# Patient Record
Sex: MALE | Race: WHITE | NOT HISPANIC OR LATINO | URBAN - METROPOLITAN AREA
[De-identification: names, ages, dates, MRNs, and addresses within clinical notes are randomized per-mention and may not be internally consistent; named-entity substitution may affect disease eponyms.]

---

## 2020-07-02 ENCOUNTER — TELEPHONE ENCOUNTER (OUTPATIENT)
Dept: URBAN - METROPOLITAN AREA CLINIC 19 | Facility: CLINIC | Age: 70
End: 2020-07-02

## 2020-07-09 ENCOUNTER — OFFICE VISIT (OUTPATIENT)
Dept: URBAN - METROPOLITAN AREA CLINIC 19 | Facility: CLINIC | Age: 70
End: 2020-07-09
Payer: COMMERCIAL

## 2020-07-09 DIAGNOSIS — K44.9 HIATAL HERNIA: ICD-10-CM

## 2020-07-09 DIAGNOSIS — K21.9 GASTROESOPHAGEAL REFLUX DISEASE, ESOPHAGITIS PRESENCE NOT SPECIFIED: ICD-10-CM

## 2020-07-09 DIAGNOSIS — K22.4 INEFFECTIVE ESOPHAGEAL MOTILITY: ICD-10-CM

## 2020-07-09 DIAGNOSIS — R05 CHRONIC COUGH: ICD-10-CM

## 2020-07-09 PROBLEM — 235595009: Status: ACTIVE | Noted: 2020-07-09

## 2020-07-09 PROCEDURE — G8427 DOCREV CUR MEDS BY ELIG CLIN: HCPCS | Performed by: INTERNAL MEDICINE

## 2020-07-09 PROCEDURE — 99213 OFFICE O/P EST LOW 20 MIN: CPT | Performed by: INTERNAL MEDICINE

## 2020-07-09 PROCEDURE — G8420 CALC BMI NORM PARAMETERS: HCPCS | Performed by: INTERNAL MEDICINE

## 2020-07-09 PROCEDURE — G9903 PT SCRN TBCO ID AS NON USER: HCPCS | Performed by: INTERNAL MEDICINE

## 2020-07-09 PROCEDURE — 3017F COLORECTAL CA SCREEN DOC REV: CPT | Performed by: INTERNAL MEDICINE

## 2020-07-09 RX ORDER — PANTOPRAZOLE SODIUM 40 MG/1
TAKE 1 TABLET (40 MG) BY ORAL ROUTE ONCE DAILY FOR 90 DAYS TABLET, DELAYED RELEASE ORAL 1
Qty: 90 | Refills: 3 | Status: ACTIVE | COMMUNITY
Start: 2020-02-27 | End: 2021-02-21

## 2020-07-09 RX ORDER — FAMOTIDINE 40 MG/1
1 TABLET AT BEDTIME TABLET, FILM COATED ORAL ONCE A DAY
Qty: 90 TABLET | Refills: 3 | OUTPATIENT
Start: 2020-07-09

## 2020-07-09 RX ORDER — TAMSULOSIN HYDROCHLORIDE 0.4 MG/1
CAPSULE ORAL
Qty: 0 | Refills: 0 | Status: ACTIVE | COMMUNITY
Start: 2017-10-26 | End: 1900-01-01

## 2020-07-09 RX ORDER — PANTOPRAZOLE SODIUM 40 MG/1
1 TABLET TABLET, DELAYED RELEASE ORAL BID
Qty: 180 TABLET | Refills: 3 | OUTPATIENT
Start: 2020-07-09

## 2020-07-09 RX ORDER — FAMOTIDINE 40 MG/1
TAKE 1 TABLET (40 MG) BY ORAL ROUTE ONCE DAILY AT BEDTIME FOR 90 DAYS TABLET ORAL 1
Qty: 90 | Refills: 3 | Status: ACTIVE | COMMUNITY
Start: 2019-11-06 | End: 2020-10-31

## 2020-07-09 NOTE — HPI-TODAY'S VISIT:
2/2/18 (Dr. Dani Nagy): This is a scheduled follow-up appointment for this patient, a 67 year old /White male , who comes in today for follow up of recent endoscopic evaluation. His EGD was notable for a new diagnosis of Jorgensen's esophagus. He has been taking a small amoutn of apple cider vinegar before meals. He has noted improvement in his cough. He denies heartburn symptoms. His colonoscopy was notable for a serrated adenoma and he will need repeat in 2 years. A copy of this note will be sent to the referring provider.   3/1/19: Patient, who is the father of my , presents for evaluation of a chronic cough. He thought that it may be related to GERD because he had similar symptoms before that responded to a PPI, but that therapy has not brought him relief this time. He went to an urgent care-type facility recently that thought he might have a viral bronchitis. He did admit that his cough started 5-6 weeks ago when he had a head cold. He had Jorgensen's esophagus in the past, but his repeat EGD last summer was unremarkable. He was told to get another EGD in one year (August, 2019).   3/27/19: Patient returns for reevaluation of his cough. I had recommended Dexilant 60 mg po daily and ranitidine 300 mg po QHS. Also recommended Robitussin. His CXR was normal. He went to the ER on 3/12/19 for upper abdominal pain - no cause was found at that time. Labs were normal. Zofran helped. He has had no other episodes since that time.   5/17/19: Patient has been doing well since I last saw him - no further ER visits but still has intermittent cough. UGI series showed reflux and small hiatal hernia (no other abnormalities), but his manometry showed severe ineffective esophageal motility. This condition is extremely difficult to treat - he does not meet strict criteria for achalasia. We agreed to try a course of baclofen to see if it may regulate the LES better. His LES pressure was low although it had normal relaxation. There were peristaltic waves, but they were weak and did not result in clearing 9/10 times.  7/10/20: Patient presents for reevaluation of his chronic cough. He was doing well until about 2 weeks ago. Still taking pantoprazole 40 mg po daily, and he added famotidine 20 mg po BID. No relief. We discussed his diagnosis again - will try to maximize acid suppression therapy. However, I think it is reasonable to refer patient for a surgical evaluation, including Endoflip, repair of hernia, and LINX vs. Nissen.

## 2020-07-10 ENCOUNTER — TELEPHONE ENCOUNTER (OUTPATIENT)
Dept: URBAN - METROPOLITAN AREA CLINIC 19 | Facility: CLINIC | Age: 70
End: 2020-07-10

## 2020-07-16 ENCOUNTER — OFFICE VISIT (OUTPATIENT)
Dept: URBAN - METROPOLITAN AREA TELEHEALTH 2 | Facility: TELEHEALTH | Age: 70
End: 2020-07-16

## 2020-12-09 ENCOUNTER — TELEPHONE ENCOUNTER (OUTPATIENT)
Dept: URBAN - METROPOLITAN AREA CLINIC 19 | Facility: CLINIC | Age: 70
End: 2020-12-09

## 2020-12-09 ENCOUNTER — WEB ENCOUNTER (OUTPATIENT)
Dept: URBAN - METROPOLITAN AREA CLINIC 80 | Facility: CLINIC | Age: 70
End: 2020-12-09

## 2020-12-09 ENCOUNTER — WEB ENCOUNTER (OUTPATIENT)
Dept: URBAN - METROPOLITAN AREA CLINIC 19 | Facility: CLINIC | Age: 70
End: 2020-12-09

## 2020-12-10 ENCOUNTER — WEB ENCOUNTER (OUTPATIENT)
Dept: URBAN - METROPOLITAN AREA CLINIC 80 | Facility: CLINIC | Age: 70
End: 2020-12-10

## 2020-12-17 ENCOUNTER — WEB ENCOUNTER (OUTPATIENT)
Dept: URBAN - METROPOLITAN AREA CLINIC 19 | Facility: CLINIC | Age: 70
End: 2020-12-17

## 2021-02-02 ENCOUNTER — OFFICE VISIT (OUTPATIENT)
Dept: URBAN - METROPOLITAN AREA SURGERY CENTER 19 | Facility: SURGERY CENTER | Age: 71
End: 2021-02-02

## 2021-03-10 ENCOUNTER — CLAIMS CREATED FROM THE CLAIM WINDOW (OUTPATIENT)
Dept: URBAN - METROPOLITAN AREA CLINIC 4 | Facility: CLINIC | Age: 71
End: 2021-03-10
Payer: COMMERCIAL

## 2021-03-10 ENCOUNTER — OFFICE VISIT (OUTPATIENT)
Dept: URBAN - METROPOLITAN AREA SURGERY CENTER 19 | Facility: SURGERY CENTER | Age: 71
End: 2021-03-10
Payer: COMMERCIAL

## 2021-03-10 DIAGNOSIS — Z12.11 COLON CANCER SCREENING: ICD-10-CM

## 2021-03-10 DIAGNOSIS — D12.2 ADENOMA OF ASCENDING COLON: ICD-10-CM

## 2021-03-10 DIAGNOSIS — D12.4 ADENOMA OF DESCENDING COLON: ICD-10-CM

## 2021-03-10 DIAGNOSIS — D12.4 BENIGN NEOPLASM OF DESCENDING COLON: ICD-10-CM

## 2021-03-10 DIAGNOSIS — D12.2 BENIGN NEOPLASM OF ASCENDING COLON: ICD-10-CM

## 2021-03-10 PROCEDURE — G8907 PT DOC NO EVENTS ON DISCHARG: HCPCS | Performed by: INTERNAL MEDICINE

## 2021-03-10 PROCEDURE — 88305 TISSUE EXAM BY PATHOLOGIST: CPT | Performed by: PATHOLOGY

## 2021-03-10 PROCEDURE — 45385 COLONOSCOPY W/LESION REMOVAL: CPT | Performed by: INTERNAL MEDICINE

## 2021-03-10 RX ORDER — TAMSULOSIN HYDROCHLORIDE 0.4 MG/1
CAPSULE ORAL
Qty: 0 | Refills: 0 | Status: ACTIVE | COMMUNITY
Start: 2017-10-26 | End: 1900-01-01

## 2021-03-10 RX ORDER — FAMOTIDINE 40 MG/1
1 TABLET AT BEDTIME TABLET, FILM COATED ORAL ONCE A DAY
Qty: 90 TABLET | Refills: 3 | Status: ACTIVE | COMMUNITY
Start: 2020-07-09

## 2021-03-10 RX ORDER — PANTOPRAZOLE SODIUM 40 MG/1
1 TABLET TABLET, DELAYED RELEASE ORAL BID
Qty: 180 TABLET | Refills: 3 | Status: ACTIVE | COMMUNITY
Start: 2020-07-09

## 2021-04-05 ENCOUNTER — TELEPHONE ENCOUNTER (OUTPATIENT)
Dept: URBAN - METROPOLITAN AREA CLINIC 23 | Facility: CLINIC | Age: 71
End: 2021-04-05

## 2021-06-15 ENCOUNTER — WEB ENCOUNTER (OUTPATIENT)
Dept: URBAN - METROPOLITAN AREA CLINIC 80 | Facility: CLINIC | Age: 71
End: 2021-06-15

## 2021-06-15 ENCOUNTER — WEB ENCOUNTER (OUTPATIENT)
Dept: URBAN - METROPOLITAN AREA CLINIC 20 | Facility: CLINIC | Age: 71
End: 2021-06-15

## 2021-06-22 ENCOUNTER — DASHBOARD ENCOUNTERS (OUTPATIENT)
Age: 71
End: 2021-06-22

## 2021-06-22 ENCOUNTER — OFFICE VISIT (OUTPATIENT)
Dept: URBAN - METROPOLITAN AREA CLINIC 19 | Facility: CLINIC | Age: 71
End: 2021-06-22
Payer: COMMERCIAL

## 2021-06-22 ENCOUNTER — WEB ENCOUNTER (OUTPATIENT)
Dept: URBAN - METROPOLITAN AREA CLINIC 19 | Facility: CLINIC | Age: 71
End: 2021-06-22

## 2021-06-22 VITALS
HEIGHT: 67 IN | DIASTOLIC BLOOD PRESSURE: 90 MMHG | TEMPERATURE: 98.7 F | SYSTOLIC BLOOD PRESSURE: 120 MMHG | WEIGHT: 168 LBS | BODY MASS INDEX: 26.37 KG/M2

## 2021-06-22 DIAGNOSIS — R19.7 ACUTE DIARRHEA: ICD-10-CM

## 2021-06-22 PROCEDURE — 99213 OFFICE O/P EST LOW 20 MIN: CPT | Performed by: NURSE PRACTITIONER

## 2021-06-22 RX ORDER — FAMOTIDINE 40 MG/1
1 TABLET AT BEDTIME TABLET, FILM COATED ORAL ONCE A DAY
Qty: 90 TABLET | Refills: 3 | Status: ACTIVE | COMMUNITY
Start: 2020-07-09

## 2021-06-22 RX ORDER — TAMSULOSIN HYDROCHLORIDE 0.4 MG/1
CAPSULE ORAL
Qty: 0 | Refills: 0 | Status: ACTIVE | COMMUNITY
Start: 2017-10-26 | End: 1900-01-01

## 2021-06-22 RX ORDER — PANTOPRAZOLE SODIUM 40 MG/1
1 TABLET TABLET, DELAYED RELEASE ORAL BID
Qty: 180 TABLET | Refills: 3 | Status: ACTIVE | COMMUNITY
Start: 2020-07-09

## 2021-06-22 NOTE — HPI-ZZZ
2/2/18 (Dr. Dani Nagy): This is a scheduled follow-up appointment for this patient, a 67 year old /White male , who comes in today for follow up of recent endoscopic evaluation. His EGD was notable for a new diagnosis of Jorgensen's esophagus. He has been taking a small amoutn of apple cider vinegar before meals. He has noted improvement in his cough. He denies heartburn symptoms. His colonoscopy was notable for a serrated adenoma and he will need repeat in 2 years. A copy of this note will be sent to the referring provider.   3/1/19: Patient, who is the father of my , presents for evaluation of a chronic cough. He thought that it may be related to GERD because he had similar symptoms before that responded to a PPI, but that therapy has not brought him relief this time. He went to an urgent care-type facility recently that thought he might have a viral bronchitis. He did admit that his cough started 5-6 weeks ago when he had a head cold. He had Jorgensen's esophagus in the past, but his repeat EGD last summer was unremarkable. He was told to get another EGD in one year (August, 2019).   3/27/19: Patient returns for reevaluation of his cough. I had recommended Dexilant 60 mg po daily and ranitidine 300 mg po QHS. Also recommended Robitussin. His CXR was normal. He went to the ER on 3/12/19 for upper abdominal pain - no cause was found at that time. Labs were normal. Zofran helped. He has had no other episodes since that time.   5/17/19: Patient has been doing well since I last saw him - no further ER visits but still has intermittent cough. UGI series showed reflux and small hiatal hernia (no other abnormalities), but his manometry showed severe ineffective esophageal motility. This condition is extremely difficult to treat - he does not meet strict criteria for achalasia. We agreed to try a course of baclofen to see if it may regulate the LES better. His LES pressure was low although it had normal relaxation. There were peristaltic waves, but they were weak and did not result in clearing 9/10 times.  7/10/20: Patient presents for reevaluation of his chronic cough. He was doing well until about 2 weeks ago. Still taking pantoprazole 40 mg po daily, and he added famotidine 20 mg po BID. No relief. We discussed his diagnosis again - will try to maximize acid suppression therapy. However, I think it is reasonable to refer patient for a surgical evaluation, including Endoflip, repair of hernia, and LINX vs. Nissen.  6/22/20: Today patient presents for diarrhea and mucus in stool x 2-3 weeks. He has 2-3 small, loose bowel movements a day. He typically has 2 formed bowel movements a day. The mucus is clear. He sees it in the stool and when he wipes. No blood. He has some cramping and abdominal pain, mostly after eating. He has been trying probiotics for one week and took tums once to see if it would help. He has been unable to identify foods that irritate him more. He reports an increase in stress recently. No recent antibiotics or changes in medication or diet. He reports 10lb weight gain over last 6 months.  He had a colonoscopy with Dr. Nagy 3/2021. He had three 6-8mm polyps in ascending colon (serrated sessile and tubular adenoma). He had one 8mm polyp in descending colon (tublar adenoma). He was told to return in 2 years. He also had the LINX procedure, which he reports has helped with his cough and reflux.

## 2021-07-20 ENCOUNTER — WEB ENCOUNTER (OUTPATIENT)
Dept: URBAN - METROPOLITAN AREA CLINIC 19 | Facility: CLINIC | Age: 71
End: 2021-07-20

## 2021-07-27 LAB
FATS, NEUTRAL: NORMAL
FATS, TOTAL: NORMAL
Lab: (no result)
PANCREATIC ELASTASE, FECAL: 411
WHITE BLOOD CELLS (WBC), STOOL: (no result)

## 2021-07-28 ENCOUNTER — WEB ENCOUNTER (OUTPATIENT)
Dept: URBAN - METROPOLITAN AREA CLINIC 19 | Facility: CLINIC | Age: 71
End: 2021-07-28

## 2021-08-12 ENCOUNTER — TELEPHONE ENCOUNTER (OUTPATIENT)
Dept: URBAN - METROPOLITAN AREA CLINIC 19 | Facility: CLINIC | Age: 71
End: 2021-08-12

## 2021-08-23 ENCOUNTER — WEB ENCOUNTER (OUTPATIENT)
Dept: URBAN - METROPOLITAN AREA CLINIC 19 | Facility: CLINIC | Age: 71
End: 2021-08-23

## 2021-09-02 ENCOUNTER — TELEPHONE ENCOUNTER (OUTPATIENT)
Dept: URBAN - METROPOLITAN AREA CLINIC 19 | Facility: CLINIC | Age: 71
End: 2021-09-02

## 2021-09-02 ENCOUNTER — OFFICE VISIT (OUTPATIENT)
Dept: URBAN - METROPOLITAN AREA TELEHEALTH 2 | Facility: TELEHEALTH | Age: 71
End: 2021-09-02
Payer: COMMERCIAL

## 2021-09-02 DIAGNOSIS — Z87.19 HISTORY OF HIATAL HERNIA: ICD-10-CM

## 2021-09-02 DIAGNOSIS — K21.9 GERD: ICD-10-CM

## 2021-09-02 DIAGNOSIS — R19.4 CHANGE IN BOWEL HABITS: ICD-10-CM

## 2021-09-02 PROBLEM — 235595009 GASTROESOPHAGEAL REFLUX DISEASE: Status: ACTIVE | Noted: 2021-09-02

## 2021-09-02 PROBLEM — 266997008 HISTORY OF GASTROINTESTINAL DISEASE: Status: ACTIVE | Noted: 2021-09-02

## 2021-09-02 PROCEDURE — 99213 OFFICE O/P EST LOW 20 MIN: CPT | Performed by: INTERNAL MEDICINE

## 2021-09-02 RX ORDER — PANTOPRAZOLE SODIUM 40 MG/1
1 TABLET TABLET, DELAYED RELEASE ORAL BID
Qty: 180 TABLET | Refills: 3 | Status: ACTIVE | COMMUNITY
Start: 2020-07-09

## 2021-09-02 RX ORDER — FAMOTIDINE 40 MG/1
1 TABLET AT BEDTIME TABLET, FILM COATED ORAL ONCE A DAY
Qty: 90 TABLET | Refills: 3 | Status: ACTIVE | COMMUNITY
Start: 2020-07-09

## 2021-09-02 RX ORDER — TAMSULOSIN HYDROCHLORIDE 0.4 MG/1
CAPSULE ORAL
Qty: 0 | Refills: 0 | Status: ACTIVE | COMMUNITY
Start: 2017-10-26 | End: 1900-01-01

## 2021-09-02 NOTE — HPI-ZZZ
2/2/18 (Dr. Dani Nagy): This is a scheduled follow-up appointment for this patient, a 67 year old /White male , who comes in today for follow up of recent endoscopic evaluation. His EGD was notable for a new diagnosis of Jorgensen's esophagus. He has been taking a small amoutn of apple cider vinegar before meals. He has noted improvement in his cough. He denies heartburn symptoms. His colonoscopy was notable for a serrated adenoma and he will need repeat in 2 years. A copy of this note will be sent to the referring provider.   3/1/19: Patient, who is the father of my , presents for evaluation of a chronic cough. He thought that it may be related to GERD because he had similar symptoms before that responded to a PPI, but that therapy has not brought him relief this time. He went to an urgent care-type facility recently that thought he might have a viral bronchitis. He did admit that his cough started 5-6 weeks ago when he had a head cold. He had Jorgensen's esophagus in the past, but his repeat EGD last summer was unremarkable. He was told to get another EGD in one year (August, 2019).   3/27/19: Patient returns for reevaluation of his cough. I had recommended Dexilant 60 mg po daily and ranitidine 300 mg po QHS. Also recommended Robitussin. His CXR was normal. He went to the ER on 3/12/19 for upper abdominal pain - no cause was found at that time. Labs were normal. Zofran helped. He has had no other episodes since that time.   5/17/19: Patient has been doing well since I last saw him - no further ER visits but still has intermittent cough. UGI series showed reflux and small hiatal hernia (no other abnormalities), but his manometry showed severe ineffective esophageal motility. This condition is extremely difficult to treat - he does not meet strict criteria for achalasia. We agreed to try a course of baclofen to see if it may regulate the LES better. His LES pressure was low although it had normal relaxation. There were peristaltic waves, but they were weak and did not result in clearing 9/10 times.  7/10/20: Patient presents for reevaluation of his chronic cough. He was doing well until about 2 weeks ago. Still taking pantoprazole 40 mg po daily, and he added famotidine 20 mg po BID. No relief. We discussed his diagnosis again - will try to maximize acid suppression therapy. However, I think it is reasonable to refer patient for a surgical evaluation, including Endoflip, repair of hernia, and LINX vs. Nissen.  6/22/21 (Albina Tee, MALENA): Today patient presents for diarrhea and mucus in stool x 2-3 weeks. He has 2-3 small, loose bowel movements a day. He typically has 2 formed bowel movements a day. The mucus is clear. He sees it in the stool and when he wipes. No blood. He has some cramping and abdominal pain, mostly after eating. He has been trying probiotics for one week and took tums once to see if it would help. He has been unable to identify foods that irritate him more. He reports an increase in stress recently. No recent antibiotics or changes in medication or diet. He reports 10lb weight gain over last 6 months.  He had a colonoscopy with Dr. Nagy 3/2021. He had three 6-8mm polyps in ascending colon (serrated sessile and tubular adenoma). He had one 8mm polyp in descending colon (tubular adenoma). He was told to return in 2 years. He also had the LINX procedure, which he reports has helped with his cough and reflux.  9/2/21:  Patient returns today for reevaluation of his change in bowel habits.  Fecal fat, pancreatic elastase, and WBC were all within normal limits.  GI pathogen panel was negative.  His basic complaint is that his stools are just not as formed as they used to be over the past 3 months.  There is occasional mucus.  I do not have an explanation for this, but without significant pain or blood in stool and a normal colonoscopy within the past year, I do not feel that this is a clinically significant condition.  It may be a mild form of irritable bowel syndrome.

## 2021-11-18 ENCOUNTER — OFFICE VISIT (OUTPATIENT)
Dept: URBAN - METROPOLITAN AREA CLINIC 128 | Facility: CLINIC | Age: 71
End: 2021-11-18

## 2021-12-16 ENCOUNTER — OFFICE VISIT (OUTPATIENT)
Dept: URBAN - METROPOLITAN AREA TELEHEALTH 2 | Facility: TELEHEALTH | Age: 71
End: 2021-12-16
Payer: COMMERCIAL

## 2021-12-16 DIAGNOSIS — K22.4 INEFFECTIVE ESOPHAGEAL MOTILITY: ICD-10-CM

## 2021-12-16 DIAGNOSIS — R19.4 CHANGE IN BOWEL HABITS: ICD-10-CM

## 2021-12-16 DIAGNOSIS — K44.9 HIATAL HERNIA: ICD-10-CM

## 2021-12-16 DIAGNOSIS — R05.9 COUGH: ICD-10-CM

## 2021-12-16 PROCEDURE — 99213 OFFICE O/P EST LOW 20 MIN: CPT | Performed by: INTERNAL MEDICINE

## 2021-12-16 RX ORDER — TAMSULOSIN HYDROCHLORIDE 0.4 MG/1
CAPSULE ORAL
Qty: 0 | Refills: 0 | Status: ACTIVE | COMMUNITY
Start: 2017-10-26

## 2021-12-16 RX ORDER — FAMOTIDINE 40 MG/1
1 TABLET AT BEDTIME TABLET, FILM COATED ORAL ONCE A DAY
Qty: 90 TABLET | Refills: 3 | Status: ACTIVE | COMMUNITY
Start: 2020-07-09

## 2021-12-16 RX ORDER — PANTOPRAZOLE SODIUM 40 MG/1
1 TABLET TABLET, DELAYED RELEASE ORAL BID
Qty: 180 TABLET | Refills: 3 | Status: ACTIVE | COMMUNITY
Start: 2020-07-09

## 2021-12-16 NOTE — HPI-ZZZ
2/2/18 (Dr. Dani Nagy): This is a scheduled follow-up appointment for this patient, a 67 year old /White male , who comes in today for follow up of recent endoscopic evaluation. His EGD was notable for a new diagnosis of Jorgensen's esophagus. He has been taking a small amoutn of apple cider vinegar before meals. He has noted improvement in his cough. He denies heartburn symptoms. His colonoscopy was notable for a serrated adenoma and he will need repeat in 2 years. A copy of this note will be sent to the referring provider.   3/1/19: Patient, who is the father of my , presents for evaluation of a chronic cough. He thought that it may be related to GERD because he had similar symptoms before that responded to a PPI, but that therapy has not brought him relief this time. He went to an urgent care-type facility recently that thought he might have a viral bronchitis. He did admit that his cough started 5-6 weeks ago when he had a head cold. He had Jorgensen's esophagus in the past, but his repeat EGD last summer was unremarkable. He was told to get another EGD in one year (August, 2019).   3/27/19: Patient returns for reevaluation of his cough. I had recommended Dexilant 60 mg po daily and ranitidine 300 mg po QHS. Also recommended Robitussin. His CXR was normal. He went to the ER on 3/12/19 for upper abdominal pain - no cause was found at that time. Labs were normal. Zofran helped. He has had no other episodes since that time.   5/17/19: Patient has been doing well since I last saw him - no further ER visits but still has intermittent cough. UGI series showed reflux and small hiatal hernia (no other abnormalities), but his manometry showed severe ineffective esophageal motility. This condition is extremely difficult to treat - he does not meet strict criteria for achalasia. We agreed to try a course of baclofen to see if it may regulate the LES better. His LES pressure was low although it had normal relaxation. There were peristaltic waves, but they were weak and did not result in clearing 9/10 times.  7/10/20: Patient presents for reevaluation of his chronic cough. He was doing well until about 2 weeks ago. Still taking pantoprazole 40 mg po daily, and he added famotidine 20 mg po BID. No relief. We discussed his diagnosis again - will try to maximize acid suppression therapy. However, I think it is reasonable to refer patient for a surgical evaluation, including Endoflip, repair of hernia, and LINX vs. Nissen.  6/22/21 (Albian Tee, MALENA): Today patient presents for diarrhea and mucus in stool x 2-3 weeks. He has 2-3 small, loose bowel movements a day. He typically has 2 formed bowel movements a day. The mucus is clear. He sees it in the stool and when he wipes. No blood. He has some cramping and abdominal pain, mostly after eating. He has been trying probiotics for one week and took tums once to see if it would help. He has been unable to identify foods that irritate him more. He reports an increase in stress recently. No recent antibiotics or changes in medication or diet. He reports 10lb weight gain over last 6 months.  He had a colonoscopy with Dr. Nagy 3/2021. He had three 6-8mm polyps in ascending colon (serrated sessile and tubular adenoma). He had one 8mm polyp in descending colon (tubular adenoma). He was told to return in 2 years. He also had the LINX procedure, which he reports has helped with his cough and reflux.  9/2/21:  Patient returns today for reevaluation of his change in bowel habits.  Fecal fat, pancreatic elastase, and WBC were all within normal limits.  GI pathogen panel was negative.  His basic complaint is that his stools are just not as formed as they used to be over the past 3 months.  There is occasional mucus.  I do not have an explanation for this, but without significant pain or blood in stool and a normal colonoscopy within the past year, I do not feel that this is a clinically significant condition.  It may be a mild form of irritable bowel syndrome.  12/17/21:  Patient presents for follow-up of his various GI issues.  His cough is much better after surgery - right now he has a little bit of an upper respiratory infection.  His stools have improved with probiotics - no mucus but still soft.  He has no other complaints.

## 2021-12-17 PROBLEM — 84089009: Status: ACTIVE | Noted: 2020-07-09

## 2021-12-17 PROBLEM — 266434009: Status: ACTIVE | Noted: 2020-07-09

## 2021-12-17 PROBLEM — 88111009 CHANGE IN BOWEL HABIT: Status: ACTIVE | Noted: 2021-09-02

## 2022-03-08 ENCOUNTER — WEB ENCOUNTER (OUTPATIENT)
Dept: URBAN - METROPOLITAN AREA CLINIC 80 | Facility: CLINIC | Age: 72
End: 2022-03-08

## 2023-02-06 ENCOUNTER — WEB ENCOUNTER (OUTPATIENT)
Dept: URBAN - METROPOLITAN AREA CLINIC 80 | Facility: CLINIC | Age: 73
End: 2023-02-06

## 2023-02-06 RX ORDER — SODIUM SULFATE, MAGNESIUM SULFATE, AND POTASSIUM CHLORIDE 17.75; 2.7; 2.25 G/1; G/1; G/1
12 TABLETS TABLET ORAL
Qty: 24 TABLETS | Refills: 0 | OUTPATIENT
Start: 2023-02-07 | End: 2023-02-08

## 2023-02-07 ENCOUNTER — WEB ENCOUNTER (OUTPATIENT)
Dept: URBAN - METROPOLITAN AREA CLINIC 80 | Facility: CLINIC | Age: 73
End: 2023-02-07

## 2023-02-09 ENCOUNTER — WEB ENCOUNTER (OUTPATIENT)
Dept: URBAN - METROPOLITAN AREA CLINIC 19 | Facility: CLINIC | Age: 73
End: 2023-02-09

## 2023-02-13 ENCOUNTER — WEB ENCOUNTER (OUTPATIENT)
Dept: URBAN - METROPOLITAN AREA CLINIC 80 | Facility: CLINIC | Age: 73
End: 2023-02-13

## 2023-02-21 PROBLEM — 428283002 HISTORY OF POLYP OF COLON: Status: ACTIVE | Noted: 2023-02-21

## 2023-03-15 ENCOUNTER — WEB ENCOUNTER (OUTPATIENT)
Dept: URBAN - METROPOLITAN AREA CLINIC 80 | Facility: CLINIC | Age: 73
End: 2023-03-15

## 2023-03-15 RX ORDER — SOD SULF/POT CHLORIDE/MAG SULF 1.479 G
AS DIRECTED TABLET ORAL ONCE
Qty: 1 | OUTPATIENT
Start: 2023-03-15 | End: 2023-03-16

## 2023-03-16 ENCOUNTER — CLAIMS CREATED FROM THE CLAIM WINDOW (OUTPATIENT)
Dept: URBAN - METROPOLITAN AREA SURGERY CENTER 19 | Facility: SURGERY CENTER | Age: 73
End: 2023-03-16
Payer: COMMERCIAL

## 2023-03-16 ENCOUNTER — OFFICE VISIT (OUTPATIENT)
Dept: URBAN - METROPOLITAN AREA SURGERY CENTER 19 | Facility: SURGERY CENTER | Age: 73
End: 2023-03-16

## 2023-03-16 ENCOUNTER — CLAIMS CREATED FROM THE CLAIM WINDOW (OUTPATIENT)
Dept: URBAN - METROPOLITAN AREA CLINIC 4 | Facility: CLINIC | Age: 73
End: 2023-03-16
Payer: COMMERCIAL

## 2023-03-16 DIAGNOSIS — D12.0 ADENOMA OF CECUM: ICD-10-CM

## 2023-03-16 DIAGNOSIS — Z86.010 ADENOMAS PERSONAL HISTORY OF COLONIC POLYPS: ICD-10-CM

## 2023-03-16 DIAGNOSIS — D12.0 BENIGN NEOPLASM OF CECUM: ICD-10-CM

## 2023-03-16 PROCEDURE — 45385 COLONOSCOPY W/LESION REMOVAL: CPT | Performed by: INTERNAL MEDICINE

## 2023-03-16 PROCEDURE — 88305 TISSUE EXAM BY PATHOLOGIST: CPT | Performed by: PATHOLOGY

## 2023-03-16 PROCEDURE — G8907 PT DOC NO EVENTS ON DISCHARG: HCPCS | Performed by: INTERNAL MEDICINE

## 2024-10-24 ENCOUNTER — APPOINTMENT (RX ONLY)
Age: 74
Setting detail: DERMATOLOGY
End: 2024-10-24

## 2024-10-24 DIAGNOSIS — Z87.2 PERSONAL HISTORY OF DISEASES OF THE SKIN AND SUBCUTANEOUS TISSUE: ICD-10-CM

## 2024-10-24 DIAGNOSIS — D18.0 HEMANGIOMA: ICD-10-CM

## 2024-10-24 DIAGNOSIS — I87.2 VENOUS INSUFFICIENCY (CHRONIC) (PERIPHERAL): ICD-10-CM

## 2024-10-24 DIAGNOSIS — L57.0 ACTINIC KERATOSIS: ICD-10-CM

## 2024-10-24 DIAGNOSIS — D22 MELANOCYTIC NEVI: ICD-10-CM

## 2024-10-24 DIAGNOSIS — L81.4 OTHER MELANIN HYPERPIGMENTATION: ICD-10-CM

## 2024-10-24 DIAGNOSIS — D485 NEOPLASM OF UNCERTAIN BEHAVIOR OF SKIN: ICD-10-CM

## 2024-10-24 DIAGNOSIS — L82.1 OTHER SEBORRHEIC KERATOSIS: ICD-10-CM

## 2024-10-24 PROBLEM — D48.5 NEOPLASM OF UNCERTAIN BEHAVIOR OF SKIN: Status: ACTIVE | Noted: 2024-10-24

## 2024-10-24 PROBLEM — D22.5 MELANOCYTIC NEVI OF TRUNK: Status: ACTIVE | Noted: 2024-10-24

## 2024-10-24 PROBLEM — D18.01 HEMANGIOMA OF SKIN AND SUBCUTANEOUS TISSUE: Status: ACTIVE | Noted: 2024-10-24

## 2024-10-24 PROCEDURE — ? COUNSELING

## 2024-10-24 PROCEDURE — 99213 OFFICE O/P EST LOW 20 MIN: CPT | Mod: 25

## 2024-10-24 PROCEDURE — 17000 DESTRUCT PREMALG LESION: CPT | Mod: 59

## 2024-10-24 PROCEDURE — ? BIOPSY BY SHAVE METHOD

## 2024-10-24 PROCEDURE — 17003 DESTRUCT PREMALG LES 2-14: CPT

## 2024-10-24 PROCEDURE — ? LIQUID NITROGEN

## 2024-10-24 PROCEDURE — 11102 TANGNTL BX SKIN SINGLE LES: CPT

## 2024-10-24 ASSESSMENT — LOCATION DETAILED DESCRIPTION DERM
LOCATION DETAILED: RIGHT PROXIMAL PRETIBIAL REGION
LOCATION DETAILED: PERIUMBILICAL SKIN
LOCATION DETAILED: LEFT DISTAL DORSAL FOREARM
LOCATION DETAILED: RIGHT DISTAL CALF
LOCATION DETAILED: LEFT NASAL SIDEWALL
LOCATION DETAILED: LEFT PROXIMAL RADIAL DORSAL FOREARM
LOCATION DETAILED: RIGHT DISTAL DORSAL FOREARM
LOCATION DETAILED: LEFT PROXIMAL PRETIBIAL REGION
LOCATION DETAILED: RIGHT ANTERIOR PROXIMAL THIGH
LOCATION DETAILED: LEFT INFERIOR TEMPLE
LOCATION DETAILED: STERNUM
LOCATION DETAILED: LEFT CENTRAL MALAR CHEEK
LOCATION DETAILED: NASAL DORSUM

## 2024-10-24 ASSESSMENT — LOCATION ZONE DERM
LOCATION ZONE: NOSE
LOCATION ZONE: LEG
LOCATION ZONE: FACE
LOCATION ZONE: ARM
LOCATION ZONE: TRUNK

## 2024-10-24 ASSESSMENT — LOCATION SIMPLE DESCRIPTION DERM
LOCATION SIMPLE: CHEST
LOCATION SIMPLE: LEFT TEMPLE
LOCATION SIMPLE: LEFT CHEEK
LOCATION SIMPLE: LEFT NOSE
LOCATION SIMPLE: RIGHT FOREARM
LOCATION SIMPLE: LEFT FOREARM
LOCATION SIMPLE: ABDOMEN
LOCATION SIMPLE: RIGHT CALF
LOCATION SIMPLE: RIGHT THIGH
LOCATION SIMPLE: NOSE
LOCATION SIMPLE: LEFT PRETIBIAL REGION
LOCATION SIMPLE: RIGHT PRETIBIAL REGION

## 2024-10-24 NOTE — PROCEDURE: LIQUID NITROGEN
Duration Of Freeze Thaw-Cycle (Seconds): 4
Render Note In Bullet Format When Appropriate: No
Consent: The patient's consent was obtained including but not limited to risks of crusting, scabbing, blistering, scarring, darker or lighter pigmentary change, recurrence, incomplete removal and infection.
Application Tool (Optional): Liquid Nitrogen Sprayer
Post-Care Instructions: I reviewed with the patient in detail post-care instructions. Patient is to wear sunprotection, and avoid picking at any of the treated lesions. Pt may apply Vaseline to crusted or scabbing areas.
Render Post-Care Instructions In Note?: yes
Number Of Freeze-Thaw Cycles: 1 freeze-thaw cycle
Detail Level: Detailed

## 2024-10-24 NOTE — PROCEDURE: COUNSELING
Detail Level: Detailed
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
Detail Level: Generalized
Sunscreen Recommendations: Blue Lizard SPF 30
Prescription Strength Graduated Compression Stockings Recommendations: The patient was counseled that prescription strength graduated compression stockings should be worn for all waking hours. They will follow up with a venous specialist to monitor graduated compression stocking usage and their symptoms.
Detail Level: Zone

## 2025-01-15 ENCOUNTER — APPOINTMENT (OUTPATIENT)
Age: 75
Setting detail: DERMATOLOGY
End: 2025-01-15

## 2025-01-15 DIAGNOSIS — Z87.2 PERSONAL HISTORY OF DISEASES OF THE SKIN AND SUBCUTANEOUS TISSUE: ICD-10-CM

## 2025-01-15 DIAGNOSIS — L81.4 OTHER MELANIN HYPERPIGMENTATION: ICD-10-CM

## 2025-01-15 DIAGNOSIS — D22 MELANOCYTIC NEVI: ICD-10-CM

## 2025-01-15 DIAGNOSIS — L57.0 ACTINIC KERATOSIS: ICD-10-CM

## 2025-01-15 PROBLEM — D22.39 MELANOCYTIC NEVI OF OTHER PARTS OF FACE: Status: ACTIVE | Noted: 2025-01-15

## 2025-01-15 PROCEDURE — 99213 OFFICE O/P EST LOW 20 MIN: CPT | Mod: 25

## 2025-01-15 PROCEDURE — 17003 DESTRUCT PREMALG LES 2-14: CPT

## 2025-01-15 PROCEDURE — ? LIQUID NITROGEN

## 2025-01-15 PROCEDURE — 17000 DESTRUCT PREMALG LESION: CPT

## 2025-01-15 PROCEDURE — ? COUNSELING

## 2025-01-15 ASSESSMENT — LOCATION DETAILED DESCRIPTION DERM
LOCATION DETAILED: LEFT FOREHEAD
LOCATION DETAILED: LEFT LATERAL MALAR CHEEK
LOCATION DETAILED: LEFT CLAVICULAR SKIN
LOCATION DETAILED: LEFT CENTRAL MALAR CHEEK

## 2025-01-15 ASSESSMENT — LOCATION ZONE DERM
LOCATION ZONE: FACE
LOCATION ZONE: TRUNK

## 2025-01-15 ASSESSMENT — LOCATION SIMPLE DESCRIPTION DERM
LOCATION SIMPLE: LEFT FOREHEAD
LOCATION SIMPLE: LEFT CHEEK
LOCATION SIMPLE: LEFT CLAVICULAR SKIN

## 2025-01-15 NOTE — PROCEDURE: LIQUID NITROGEN
Detail Level: Zone
Duration Of Freeze Thaw-Cycle (Seconds): 0
Post-Care Instructions: I reviewed with the patient in detail post-care instructions. Patient is to wear sunprotection, and avoid picking at any of the treated lesions. Pt may apply Vaseline to crusted or scabbing areas.
Number Of Freeze-Thaw Cycles: 1 freeze-thaw cycle
Show Applicator Variable?: Yes
Render Note In Bullet Format When Appropriate: No
Application Tool (Optional): Liquid Nitrogen Sprayer
Consent: The patient's consent was obtained including but not limited to risks of crusting, scabbing, blistering, scarring, darker or lighter pigmentary change, recurrence, incomplete removal and infection.
Aperture Size (Optional): C

## 2025-01-15 NOTE — PROCEDURE: COUNSELING
Detail Level: Zone
Patient Specific Counseling (Will Not Stick From Patient To Patient): Dysplastic nevus of skin: bx proven mild DN, (+) margins on the left mid chest, excised 4/22/24\\nbx proven mild DN, margins clear on the right upper back 7/27/17

## 2025-05-13 ENCOUNTER — APPOINTMENT (OUTPATIENT)
Age: 75
Setting detail: DERMATOLOGY
End: 2025-05-13

## 2025-05-13 DIAGNOSIS — D18.0 HEMANGIOMA: ICD-10-CM

## 2025-05-13 DIAGNOSIS — L57.0 ACTINIC KERATOSIS: ICD-10-CM

## 2025-05-13 DIAGNOSIS — I87.2 VENOUS INSUFFICIENCY (CHRONIC) (PERIPHERAL): ICD-10-CM

## 2025-05-13 DIAGNOSIS — L81.4 OTHER MELANIN HYPERPIGMENTATION: ICD-10-CM

## 2025-05-13 DIAGNOSIS — Z87.2 PERSONAL HISTORY OF DISEASES OF THE SKIN AND SUBCUTANEOUS TISSUE: ICD-10-CM

## 2025-05-13 DIAGNOSIS — L82.1 OTHER SEBORRHEIC KERATOSIS: ICD-10-CM

## 2025-05-13 DIAGNOSIS — D22 MELANOCYTIC NEVI: ICD-10-CM

## 2025-05-13 PROBLEM — D22.5 MELANOCYTIC NEVI OF TRUNK: Status: ACTIVE | Noted: 2025-05-13

## 2025-05-13 PROBLEM — D18.01 HEMANGIOMA OF SKIN AND SUBCUTANEOUS TISSUE: Status: ACTIVE | Noted: 2025-05-13

## 2025-05-13 PROCEDURE — ? LIQUID NITROGEN

## 2025-05-13 PROCEDURE — ? COUNSELING

## 2025-05-13 PROCEDURE — 99213 OFFICE O/P EST LOW 20 MIN: CPT | Mod: 25

## 2025-05-13 PROCEDURE — ? ADDITIONAL NOTES

## 2025-05-13 PROCEDURE — 17000 DESTRUCT PREMALG LESION: CPT

## 2025-05-13 ASSESSMENT — LOCATION ZONE DERM
LOCATION ZONE: TRUNK
LOCATION ZONE: FACE
LOCATION ZONE: LEG

## 2025-05-13 ASSESSMENT — LOCATION SIMPLE DESCRIPTION DERM
LOCATION SIMPLE: LEFT PRETIBIAL REGION
LOCATION SIMPLE: ABDOMEN
LOCATION SIMPLE: CHEST
LOCATION SIMPLE: RIGHT PRETIBIAL REGION
LOCATION SIMPLE: LEFT CHEEK

## 2025-05-13 ASSESSMENT — LOCATION DETAILED DESCRIPTION DERM
LOCATION DETAILED: LEFT CENTRAL MALAR CHEEK
LOCATION DETAILED: LEFT PROXIMAL PRETIBIAL REGION
LOCATION DETAILED: STERNUM
LOCATION DETAILED: RIGHT PROXIMAL PRETIBIAL REGION
LOCATION DETAILED: PERIUMBILICAL SKIN

## 2025-05-13 NOTE — PROCEDURE: ADDITIONAL NOTES
Additional Notes: Bx proven mild DN, (+) margins on the left mid chest, excised 4/22/24\\nBx proven mild DN, margins clear on the right upper back 7/27/17
Detail Level: Simple
Render Risk Assessment In Note?: no

## 2025-05-13 NOTE — PROCEDURE: LIQUID NITROGEN
Detail Level: Detailed
Show Applicator Variable?: Yes
Consent: The patient's consent was obtained including but not limited to risks of crusting, scabbing, blistering, scarring, darker or lighter pigmentary change, recurrence, incomplete removal and infection.
Render Note In Bullet Format When Appropriate: No
Number Of Freeze-Thaw Cycles: 1 freeze-thaw cycle
Duration Of Freeze Thaw-Cycle (Seconds): 4
Post-Care Instructions: I reviewed with the patient in detail post-care instructions. Patient is to wear sunprotection, and avoid picking at any of the treated lesions. Pt may apply Vaseline to crusted or scabbing areas.
Application Tool (Optional): Liquid Nitrogen Sprayer

## 2025-05-13 NOTE — PROCEDURE: COUNSELING
Detail Level: Generalized
Sunscreen Recommendations: Blue Lizard SPF 30
Detail Level: Zone
Detail Level: Detailed
Prescription Strength Graduated Compression Stockings Recommendations: The patient was counseled that prescription strength graduated compression stockings should be worn for all waking hours. They will follow up with a venous specialist to monitor graduated compression stocking usage and their symptoms.

## 2025-05-13 NOTE — PROCEDURE: MIPS QUALITY
Quality 226: Preventive Care And Screening: Tobacco Use: Screening And Cessation Intervention: Patient screened for tobacco use and is an ex/non-smoker
Additional Notes: Pt did not receive [id number] COVID-19 Vaccine
Detail Level: Detailed
Quality 47: Advance Care Plan: Advance Care Planning discussed and documented in the medical record; patient did not wish or was not able to name a surrogate decision maker or provide an advance care plan.
Quality 431: Preventive Care And Screening: Unhealthy Alcohol Use - Screening: Patient not identified as an unhealthy alcohol user when screened for unhealthy alcohol use using a systematic screening method

## 2025-07-29 ENCOUNTER — LAB OUTSIDE AN ENCOUNTER (OUTPATIENT)
Dept: URBAN - METROPOLITAN AREA CLINIC 19 | Facility: CLINIC | Age: 75
End: 2025-07-29

## 2025-07-29 ENCOUNTER — OFFICE VISIT (OUTPATIENT)
Dept: URBAN - METROPOLITAN AREA CLINIC 19 | Facility: CLINIC | Age: 75
End: 2025-07-29
Payer: COMMERCIAL

## 2025-07-29 DIAGNOSIS — R05.9 COUGHING: ICD-10-CM

## 2025-07-29 PROCEDURE — 99203 OFFICE O/P NEW LOW 30 MIN: CPT

## 2025-07-29 PROCEDURE — 99213 OFFICE O/P EST LOW 20 MIN: CPT

## 2025-07-29 RX ORDER — FAMOTIDINE 40 MG/1
1 TABLET AT BEDTIME TABLET, FILM COATED ORAL ONCE A DAY
Qty: 90 TABLET | Refills: 3 | Status: DISCONTINUED | COMMUNITY
Start: 2020-07-09

## 2025-07-29 RX ORDER — OMEPRAZOLE 20 MG/1
1 CAPSULE 30 MINUTES BEFORE MORNING MEAL CAPSULE, DELAYED RELEASE ORAL ONCE A DAY
Qty: 90 | Refills: 1 | OUTPATIENT
Start: 2025-07-29

## 2025-07-29 RX ORDER — PANTOPRAZOLE SODIUM 40 MG/1
1 TABLET TABLET, DELAYED RELEASE ORAL BID
Qty: 180 TABLET | Refills: 3 | Status: DISCONTINUED | COMMUNITY
Start: 2020-07-09

## 2025-07-29 RX ORDER — TAMSULOSIN HYDROCHLORIDE 0.4 MG/1
CAPSULE ORAL
Qty: 0 | Refills: 0 | Status: DISCONTINUED | COMMUNITY
Start: 2017-10-26

## 2025-07-29 NOTE — HPI-TODAY'S VISIT:
Mr. Harrell is a 74-year-old male with PMH of Linx procedure by Dr. Delgado, prostate cancer s/p prostatectomy presents for evaluation of cough. Pt reports symptom started about 2 months ago with associated throat itching. He was evaluatied by PCP and prescribed Amoxicillin and a steroid. It is aggravate by talking and  alleviated by nothing. Describes as dry.  Denies nausea, vomiting, sick contacts. No heart, lung or kidney disease.  Previous GI workup: 8/2018 EGD noted Z-line irregular, 37 cm from the incisors, small hiatal hernia and gastritis.  GE junction biopsies with reflux type changes, negative for intestinal metaplasia or H. pylori. 11/2019 EGD by Dr. Rodriguez noted LA grade B reflux esophagitis, medium size hiatal hernia and chronic gastritis.  Biopsies were negative for Jorgensen's. 3/2021 colonoscopy by Dr. Dani Nagy noted 3 polyps that were removed from the ascending colon and 1 polyp was removed from the descending colon.  Path: Tubular and sessile serrated adenoma. 3/2023 colonoscopy by Dr. Dani Nagy noted internal hemorrhoids, diverticulosis in the sigmoid and descending colon and 1 sessile serrated polyp was removed from the cecum.  Due for surveillance in 2026.

## 2025-08-13 ENCOUNTER — OFFICE VISIT (OUTPATIENT)
Dept: URBAN - METROPOLITAN AREA SURGERY CENTER 31 | Facility: SURGERY CENTER | Age: 75
End: 2025-08-13
Payer: COMMERCIAL

## 2025-08-13 DIAGNOSIS — Z85.46 HISTORY OF PROSTATE CANCER: ICD-10-CM

## 2025-08-13 DIAGNOSIS — M19.90 ARTHRITIS: ICD-10-CM

## 2025-08-13 DIAGNOSIS — I49.9 CARDIAC ARRHYTHMIA, UNSPECIFIED CARDIAC ARRHYTHMIA TYPE: ICD-10-CM

## 2025-08-13 DIAGNOSIS — K21.9 GASTRO-ESOPHAGEAL REFLUX DISEASE WITHOUT ESOPHAGITIS: ICD-10-CM

## 2025-08-13 DIAGNOSIS — R05.3 CHRONIC COUGH: ICD-10-CM

## 2025-08-13 PROCEDURE — 00731 ANES UPR GI NDSC PX NOS: CPT | Performed by: NURSE ANESTHETIST, CERTIFIED REGISTERED

## 2025-08-13 PROCEDURE — 43235 EGD DIAGNOSTIC BRUSH WASH: CPT | Performed by: INTERNAL MEDICINE

## 2025-08-13 RX ORDER — OMEPRAZOLE 20 MG/1
1 CAPSULE 30 MINUTES BEFORE MORNING MEAL CAPSULE, DELAYED RELEASE ORAL ONCE A DAY
Qty: 90 | Refills: 1 | Status: ACTIVE | COMMUNITY
Start: 2025-07-29